# Patient Record
Sex: FEMALE | Race: BLACK OR AFRICAN AMERICAN | NOT HISPANIC OR LATINO | Employment: PART TIME | URBAN - METROPOLITAN AREA
[De-identification: names, ages, dates, MRNs, and addresses within clinical notes are randomized per-mention and may not be internally consistent; named-entity substitution may affect disease eponyms.]

---

## 2018-04-06 ENCOUNTER — TELEPHONE (OUTPATIENT)
Dept: OPHTHALMOLOGY | Facility: CLINIC | Age: 28
End: 2018-04-06

## 2018-04-06 NOTE — TELEPHONE ENCOUNTER
----- Message from Malathi De sent at 4/6/2018 11:37 AM CDT -----  Contact: Monalisa Villalobos pt called to discuss possibly scheduling a consult for cross linking, but need further information before doing so. Pt can be reached at [phone].

## 2018-04-09 ENCOUNTER — TELEPHONE (OUTPATIENT)
Dept: OPHTHALMOLOGY | Facility: CLINIC | Age: 28
End: 2018-04-09

## 2018-04-09 NOTE — TELEPHONE ENCOUNTER
----- Message from Mary Traore sent at 4/9/2018  1:28 PM CDT -----  Contact: Monalisa Serrano would like to schedule cross-linking consult. She can be reach [phone].

## 2018-04-24 ENCOUNTER — INITIAL CONSULT (OUTPATIENT)
Dept: OPHTHALMOLOGY | Facility: CLINIC | Age: 28
End: 2018-04-24
Attending: OPHTHALMOLOGY
Payer: MEDICAID

## 2018-04-24 DIAGNOSIS — H18.719 CORNEAL ECTASIA DUE TO LASER IN SITU KERATOMILEUSIS: Primary | ICD-10-CM

## 2018-04-24 DIAGNOSIS — H59.89 CORNEAL ECTASIA DUE TO LASER IN SITU KERATOMILEUSIS: Primary | ICD-10-CM

## 2018-04-24 PROCEDURE — 99999 PR PBB SHADOW E&M-EST. PATIENT-LVL II: CPT | Mod: PBBFAC,,, | Performed by: OPHTHALMOLOGY

## 2018-04-24 PROCEDURE — 99212 OFFICE O/P EST SF 10 MIN: CPT | Mod: PBBFAC | Performed by: OPHTHALMOLOGY

## 2018-04-24 PROCEDURE — 92004 COMPRE OPH EXAM NEW PT 1/>: CPT | Mod: S$PBB,,, | Performed by: OPHTHALMOLOGY

## 2018-04-24 RX ORDER — PHENTERMINE HYDROCHLORIDE 37.5 MG/1
37.5 TABLET ORAL DAILY
Refills: 0 | COMMUNITY
Start: 2018-04-13

## 2018-04-24 NOTE — PROGRESS NOTES
HPI     Referred by Dr. Wilmer archer/[facility] in [place], [place]    Patient states she had LASIK 12/ 2012 in [place]  Dr. Garcia. She began   to have decreased vision about 6 months after her LASIK. Vision began to   get worse in 2015    Last edited by Radha Patel on 4/24/2018  3:39 PM. (History)            Assessment /Plan     For exam results, see Encounter Report.    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  Treatment with the use of specialty contact lenses, collagen cross linking, and corneal transplantation was explained in detail.    This patient exhibits signs of progression of keratoconus and failure of conservative treatments with spectacles and/or contact lenses.   Disease progression is indicated by   - An increase of >1D in the Kmax  - An increase of >1D of astigmatism in the MRx  - An increase in myopia of >0.50D on MRx    I recommend treatment with Collagen Crosslinking using the Avedro FDA approved epi-off protocol with Photrexa and the KXL System, in order to stabilize this condition.    Plan:   KXL treatment OS worse, then OD  , 317 (OS may be too thin?)    I have informed the patient that we will seek insurance pre-approval, but in case of failure of the insurance company to cover the cost of this medically necessary procedure, there may be a cost of $2,000 for the patient.    RTC with topos from 2015... OD

## 2018-06-12 ENCOUNTER — OFFICE VISIT (OUTPATIENT)
Dept: OPHTHALMOLOGY | Facility: CLINIC | Age: 28
End: 2018-06-12
Attending: OPHTHALMOLOGY
Payer: MEDICAID

## 2018-06-12 DIAGNOSIS — H59.89 CORNEAL ECTASIA DUE TO LASER IN SITU KERATOMILEUSIS: ICD-10-CM

## 2018-06-12 DIAGNOSIS — H18.719 CORNEAL ECTASIA DUE TO LASER IN SITU KERATOMILEUSIS: ICD-10-CM

## 2018-06-12 DIAGNOSIS — H18.603 KERATOCONUS OF BOTH EYES: Primary | ICD-10-CM

## 2018-06-12 PROCEDURE — 92014 COMPRE OPH EXAM EST PT 1/>: CPT | Mod: S$PBB,,, | Performed by: OPHTHALMOLOGY

## 2018-06-12 PROCEDURE — 99212 OFFICE O/P EST SF 10 MIN: CPT | Mod: PBBFAC | Performed by: OPHTHALMOLOGY

## 2018-06-12 PROCEDURE — 99999 PR PBB SHADOW E&M-EST. PATIENT-LVL II: CPT | Mod: PBBFAC,,, | Performed by: OPHTHALMOLOGY

## 2018-06-12 NOTE — PROGRESS NOTES
HPI     DLS: 04/24/2018 Dr. Contreras    Referred by Dr. Wilmer archer/[facility] in [place], [place]    Patient states that there are no changes in vision or eyes since last   visit.    Eye Med(s): none only contact lens solution    Last edited by Junie Booker MA on 6/12/2018 11:49 AM. (History)            Assessment /Plan     For exam results, see Encounter Report.    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  Treatment with the use of specialty contact lenses, collagen cross linking, and corneal transplantation was explained in detail.    This patient exhibits signs of progression of keratoconus and failure of conservative treatments with spectacles and/or contact lenses.   Disease progression is indicated by   - An increase of >1D in the Kmax  - An increase of >1D of astigmatism in the MRx  - An increase in myopia of >0.50D on MRx    I recommend treatment with Collagen Crosslinking using the Avedro FDA approved epi-off protocol with Photrexa and the KXL System, in order to stabilize this condition.    Plan:   KXL treatment OS first, then OD  , 317   Will use GREEN (hypo-osmolar) Photrexa for OS.    I have informed the patient that we will seek insurance pre-approval, but in case of failure of the insurance company to cover the cost of this medically necessary procedure, there may be a cost of $2,000 for the patient.

## 2018-06-18 ENCOUNTER — PATIENT MESSAGE (OUTPATIENT)
Dept: OPHTHALMOLOGY | Facility: CLINIC | Age: 28
End: 2018-06-18

## 2018-06-19 ENCOUNTER — TELEPHONE (OUTPATIENT)
Dept: OPHTHALMOLOGY | Facility: CLINIC | Age: 28
End: 2018-06-19

## 2018-06-19 ENCOUNTER — PATIENT MESSAGE (OUTPATIENT)
Dept: OPHTHALMOLOGY | Facility: CLINIC | Age: 28
End: 2018-06-19

## 2018-06-19 NOTE — TELEPHONE ENCOUNTER
Returned pt call and left Voicemail stating that we can not do both eyes on the same day and as of right now we do not have any sooner appt but I will put her on the waiting list if someone else cancels. SDF

## 2018-06-19 NOTE — TELEPHONE ENCOUNTER
----- Message from Radha Patel sent at 6/18/2018  4:25 PM CDT -----  Monalisa Contreras MD 5 hours ago (10:28 AM)       Hi Dr. Gary depending on what the insurance says would   It be possible to do both eyes on the 30th?

## 2018-08-01 ENCOUNTER — TELEPHONE (OUTPATIENT)
Dept: OPHTHALMOLOGY | Facility: CLINIC | Age: 28
End: 2018-08-01

## 2018-08-01 NOTE — TELEPHONE ENCOUNTER
Left VM stating that we needed to move her procedure up to 8/23 instead of 8/30 due to Dr. Contreras not being in the office that day. Ask pt to please call us back. SDF

## 2018-08-01 NOTE — TELEPHONE ENCOUNTER
----- Message from Analilia Goodson sent at 8/1/2018  8:46 AM CDT -----  Contact: Monalisa Serrano   Pt returned the called,can we called the pt back please ,pt can be reached at [phone] please thank you.

## 2018-08-02 ENCOUNTER — PATIENT MESSAGE (OUTPATIENT)
Dept: OPHTHALMOLOGY | Facility: CLINIC | Age: 28
End: 2018-08-02

## 2018-08-06 ENCOUNTER — TELEPHONE (OUTPATIENT)
Dept: OPHTHALMOLOGY | Facility: CLINIC | Age: 28
End: 2018-08-06

## 2018-08-06 NOTE — TELEPHONE ENCOUNTER
Tried to call pt and let her know that I still need to discuss with Dr. Contreras about what eye to do. Pt number stated she is not taking calls at this time. SDF

## 2018-08-06 NOTE — TELEPHONE ENCOUNTER
----- Message from Radha Patel sent at 8/2/2018  3:11 PM CDT -----  Contact: Monalisa  This patient wants to know I she can have her right done first. She would like to get it over with so she can be getting fit for a new CTL so she will have one eye when her left eye is being done. Patient is nervous about being blind. Please call patient.  ----- Message -----  From: Mary Traore  Sent: 8/2/2018  12:46 PM  To: Diane SUAREZ Staff    Ms. Maximilianaiden would like for you to contact her concerning the email that she sent. She can be reached at [phone]

## 2018-08-15 ENCOUNTER — PATIENT MESSAGE (OUTPATIENT)
Dept: OPHTHALMOLOGY | Facility: CLINIC | Age: 28
End: 2018-08-15

## 2018-08-20 ENCOUNTER — TELEPHONE (OUTPATIENT)
Dept: OPHTHALMOLOGY | Facility: CLINIC | Age: 28
End: 2018-08-20

## 2018-08-20 NOTE — TELEPHONE ENCOUNTER
----- Message from Malathi De sent at 8/20/2018 11:10 AM CDT -----  Contact: Monalisa  Patient Returning Call from [facility]    Who Left Message for Patient:Nahomy  Communication Preference:[phone]  Additional Information:

## 2018-08-20 NOTE — TELEPHONE ENCOUNTER
Left VM that gtts were called into pharmacy and where to go on Thursday Morning since we changed the location to the s center. SDF

## 2018-08-23 ENCOUNTER — TELEPHONE (OUTPATIENT)
Dept: OPHTHALMOLOGY | Facility: CLINIC | Age: 28
End: 2018-08-23

## 2018-08-23 ENCOUNTER — CLINICAL SUPPORT (OUTPATIENT)
Dept: OPHTHALMOLOGY | Facility: CLINIC | Age: 28
End: 2018-08-23
Attending: OPHTHALMOLOGY
Payer: MEDICAID

## 2018-08-23 DIAGNOSIS — H18.719 CORNEAL ECTASIA DUE TO LASER IN SITU KERATOMILEUSIS: Primary | ICD-10-CM

## 2018-08-23 DIAGNOSIS — H59.89 CORNEAL ECTASIA DUE TO LASER IN SITU KERATOMILEUSIS: Primary | ICD-10-CM

## 2018-08-23 PROCEDURE — 99999 PR PBB SHADOW E&M-EST. PATIENT-LVL I: CPT | Mod: PBBFAC,,,

## 2018-08-23 PROCEDURE — 0402T COLGN CRS-LINK CRN&PACHYMTRY: CPT | Mod: PBBFAC | Performed by: OPHTHALMOLOGY

## 2018-08-23 PROCEDURE — 0402T COLGN CRS-LINK CRN&PACHYMTRY: CPT | Mod: S$PBB,LT,, | Performed by: OPHTHALMOLOGY

## 2018-08-23 PROCEDURE — 99211 OFF/OP EST MAY X REQ PHY/QHP: CPT | Mod: PBBFAC

## 2018-08-23 PROCEDURE — 92012 INTRM OPH EXAM EST PATIENT: CPT | Mod: 57,S$PBB,, | Performed by: OPHTHALMOLOGY

## 2018-08-23 RX ORDER — OXYCODONE AND ACETAMINOPHEN 5; 325 MG/1; MG/1
1 TABLET ORAL EVERY 4 HOURS PRN
Qty: 20 TABLET | Refills: 0 | Status: SHIPPED | OUTPATIENT
Start: 2018-08-23 | End: 2018-08-28

## 2018-08-23 NOTE — TELEPHONE ENCOUNTER
----- Message from Mary Traore sent at 8/23/2018  1:38 PM CDT -----  Contact: Monalisa Serrano says the pharmacy hasn't received the prescriptions that Nahomy called in.  She can be reached at [phone]    [facility] [address] [place] [address] - [address] [facility] AT [facility] OF [address] [address])  [address] [facility] [address]  Phone: [phone] Fax: [phone]  Not a 24 hour pharmacy; exact hours not known

## 2018-08-23 NOTE — PROGRESS NOTES
HPI     Here for CXL Tx    Last edited by Elba Contreras MD on 8/23/2018  3:59 PM. (History)            Assessment /Plan     For exam results, see Encounter Report.    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    PREOPERATIVE DIAGNOSIS: post LASIK ectasia    POSTOPERATIVE DIAGNOSIS:  Post lasik ectasia    PROCEDURES:    Collagen Crosslinking Left eye    ANESTHESIA: Topical Tetracaine 0.5%    COMPLICATIONS:  None    ESTIMATED BLOOD LOSS: None    SPECIMENS: None    INDICATIONS:  The patient has a history a progressive corneal ectasia.  During the initial consultation, a thorough discussion regarding of the risks, benefits, and alternatives to this procedure was undertaken.  Risks were listed on the consent form and were reviewed with emphasis on the remote possibility of infection, inflammation, scarring, and progression of ectasia - all of which can potentially lead to loss of vision.  Common side effects from the procedure, including pain, dry eye, glare, and haloes, were also explained, as well as defining realistic expectations of stabilizing the disease but not decreasing the need for glasses or contact lenses.  The patient voices understanding of these risks and side effects and communicates realistic expectations from the procedure.     DESCRIPTION OF PROCEDURE:  The patient was admitted to the [facility] at [facility] where the operative eye and procedure were verified, vital signs were taken, and pre-operatively 5-10mg of oral diazepam and drops of Zymar and Pred Forte were administered.  The patient was brought into the LASER suite and positioned on the bed.  A central 9mm epithelial debridement was achieved with the Amoils epithelial scrubber, and the initial riboflavin drops administered. A 30 minute induction with drops every 2 minutes was followed by pachymetry. When corneal pachy is less than 400um, hypotonic riboflavin drops are used to thicken  the cornea to a minimum of 400um. Next, a 30 min UV light treatment, centered on the cornea was delivered. Antibiotics and a bandage contact lens were placed.  The patient was discharged with care instructions and a follow up appointment in the eye clinic.

## 2018-08-28 ENCOUNTER — OFFICE VISIT (OUTPATIENT)
Dept: OPHTHALMOLOGY | Facility: CLINIC | Age: 28
End: 2018-08-28
Payer: MEDICAID

## 2018-08-28 DIAGNOSIS — H18.603 KERATOCONUS OF BOTH EYES: ICD-10-CM

## 2018-08-28 DIAGNOSIS — H59.89 CORNEAL ECTASIA DUE TO LASER IN SITU KERATOMILEUSIS: Primary | ICD-10-CM

## 2018-08-28 DIAGNOSIS — H18.719 CORNEAL ECTASIA DUE TO LASER IN SITU KERATOMILEUSIS: Primary | ICD-10-CM

## 2018-08-28 PROCEDURE — 99024 POSTOP FOLLOW-UP VISIT: CPT | Mod: ,,, | Performed by: OPHTHALMOLOGY

## 2018-08-28 PROCEDURE — 99999 PR PBB SHADOW E&M-EST. PATIENT-LVL II: CPT | Mod: PBBFAC,,, | Performed by: OPHTHALMOLOGY

## 2018-08-28 PROCEDURE — 99212 OFFICE O/P EST SF 10 MIN: CPT | Mod: PBBFAC | Performed by: OPHTHALMOLOGY

## 2018-08-28 RX ORDER — PREDNISOLONE ACETATE 10 MG/ML
1 SUSPENSION/ DROPS OPHTHALMIC 4 TIMES DAILY
COMMUNITY
End: 2018-09-26

## 2018-08-28 RX ORDER — OFLOXACIN 3 MG/ML
1 SOLUTION/ DROPS OPHTHALMIC 4 TIMES DAILY
COMMUNITY
End: 2018-09-26

## 2018-08-29 NOTE — PROGRESS NOTES
HPI     Dr. Contreras pt    Referred by Dr. Guillen w/[facility] in [place], [place]    S/p CXL OS 8/23/18    PF QID OS  Ocuflox QID OS    Pt here for post op CXL OS.  Pt states BCL OS fell out yesterday.  Pt   states FBS OS.  Pt denies pain OS.     Last edited by Yael Maher MA on 8/28/2018  2:54 PM.   (History)            Assessment /Plan     For exam results, see Encounter Report.    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  F/up dr. Contreras 1 mo

## 2018-09-26 ENCOUNTER — PATIENT MESSAGE (OUTPATIENT)
Dept: OPHTHALMOLOGY | Facility: CLINIC | Age: 28
End: 2018-09-26

## 2018-09-26 ENCOUNTER — OFFICE VISIT (OUTPATIENT)
Dept: OPHTHALMOLOGY | Facility: CLINIC | Age: 28
End: 2018-09-26
Payer: MEDICAID

## 2018-09-26 DIAGNOSIS — H18.719 CORNEAL ECTASIA DUE TO LASER IN SITU KERATOMILEUSIS: Primary | ICD-10-CM

## 2018-09-26 DIAGNOSIS — H59.89 CORNEAL ECTASIA DUE TO LASER IN SITU KERATOMILEUSIS: Primary | ICD-10-CM

## 2018-09-26 PROCEDURE — 92014 COMPRE OPH EXAM EST PT 1/>: CPT | Mod: S$PBB,,, | Performed by: OPHTHALMOLOGY

## 2018-09-26 PROCEDURE — 99999 PR PBB SHADOW E&M-EST. PATIENT-LVL II: CPT | Mod: PBBFAC,,, | Performed by: OPHTHALMOLOGY

## 2018-09-26 PROCEDURE — 99212 OFFICE O/P EST SF 10 MIN: CPT | Mod: PBBFAC | Performed by: OPHTHALMOLOGY

## 2018-09-26 NOTE — PROGRESS NOTES
HPI     Referred by Dr. Wilmer archer/[facility] in [place], [place]    S/p CXL OS 8/23/18  No eyedrops    Pt here for post op CXL OS.  Pt states vision seems like it is the same as   it was before the procedure.  Pt states some dryness OS. Pt denies pain   OS.    Last edited by Yael Maher MA on 9/26/2018  2:06 PM.   (History)            Assessment /Plan     For exam results, see Encounter Report.    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  No complaints.  The diagnosis of keratoconus and its etiology and clinical course were discussed.  Treatment with the use of specialty contact lenses, collagen cross linking, and corneal transplantation was explained in detail.    This patient exhibits signs of progression of keratoconus and failure of conservative treatments with spectacles and/or contact lenses.   Disease progression is indicated by   - An increase of >1D in the Kmax  - An increase of >1D of astigmatism in the MRx  - An increase in myopia of >0.50D on MRx    I recommend treatment with Collagen Crosslinking using the Avedro FDA approved epi-off protocol with Photrexa and the KXL System, in order to stabilize this condition.    Plan:   KXL treatment OS worse, then OD  , 317 (OS may be too thin?)    I have informed the patient that we will seek insurance pre-approval, but in case of failure of the insurance company to cover the cost of this medically necessary procedure, there may be a cost of $2,000 for the patient.      Plan CXL OD

## 2018-12-28 ENCOUNTER — PATIENT MESSAGE (OUTPATIENT)
Dept: OPHTHALMOLOGY | Facility: CLINIC | Age: 28
End: 2018-12-28

## 2019-01-02 ENCOUNTER — TELEPHONE (OUTPATIENT)
Dept: OPTOMETRY | Facility: CLINIC | Age: 29
End: 2019-01-02

## 2019-01-02 NOTE — TELEPHONE ENCOUNTER
----- Message from Nahomy Barbosa sent at 1/2/2019  8:32 AM CST -----      ----- Message -----  From: Yael Maher MA  Sent: 12/31/2018   3:48 PM  To: Monalisa Patel Just now (3:48 PM)       Good afternoon! Dr Diane Black's technician who schedules the procedure is out of the office today and will return on Wednesday.  I will make sure she receives the message and gets back to you. Sorry for the inconvenience.     Yael     This Patient Portal message has not been read.    Monalisa Serrano Pulin A., MD 3 days ago       Hi guys I had something come up that is going to take me out of town on the 26th is it possible to move  my surgery till a little bit later? I hate to ask but I dont feel like I will be able to travel by the 26th.

## 2019-01-21 ENCOUNTER — PATIENT MESSAGE (OUTPATIENT)
Dept: OPHTHALMOLOGY | Facility: CLINIC | Age: 29
End: 2019-01-21

## 2020-05-22 ENCOUNTER — PATIENT MESSAGE (OUTPATIENT)
Dept: OPHTHALMOLOGY | Facility: CLINIC | Age: 30
End: 2020-05-22

## 2020-06-09 ENCOUNTER — OFFICE VISIT (OUTPATIENT)
Dept: OPHTHALMOLOGY | Facility: CLINIC | Age: 30
End: 2020-06-09
Attending: OPHTHALMOLOGY
Payer: MEDICAID

## 2020-06-09 DIAGNOSIS — H18.603 KERATOCONUS OF BOTH EYES: ICD-10-CM

## 2020-06-09 DIAGNOSIS — H59.89 CORNEAL ECTASIA DUE TO LASER IN SITU KERATOMILEUSIS: Primary | ICD-10-CM

## 2020-06-09 DIAGNOSIS — H18.719 CORNEAL ECTASIA DUE TO LASER IN SITU KERATOMILEUSIS: Primary | ICD-10-CM

## 2020-06-09 PROCEDURE — 99999 PR PBB SHADOW E&M-EST. PATIENT-LVL II: ICD-10-PCS | Mod: PBBFAC,,, | Performed by: OPHTHALMOLOGY

## 2020-06-09 PROCEDURE — 99212 OFFICE O/P EST SF 10 MIN: CPT | Mod: PBBFAC | Performed by: OPHTHALMOLOGY

## 2020-06-09 PROCEDURE — 92014 COMPRE OPH EXAM EST PT 1/>: CPT | Mod: S$PBB,,, | Performed by: OPHTHALMOLOGY

## 2020-06-09 PROCEDURE — 92014 PR EYE EXAM, EST PATIENT,COMPREHESV: ICD-10-PCS | Mod: S$PBB,,, | Performed by: OPHTHALMOLOGY

## 2020-06-09 PROCEDURE — 99999 PR PBB SHADOW E&M-EST. PATIENT-LVL II: CPT | Mod: PBBFAC,,, | Performed by: OPHTHALMOLOGY

## 2020-06-09 NOTE — PROGRESS NOTES
"HPI     DLS 09/26/2018    S/p CXL OS 8/23/18    31 yo female here today for follow up Keratoconus OS, and Pentacam OU      Pt states: vision is stable when wearing her contacts  Current pair is old and cracked and has an appt for "[facility] "   in [place] on 06/11/2020   To be refit for Keratoconus lenses OS     Gtts:   AT's PRN OU           Last edited by Viviane Cr on 6/9/2020  2:21 PM. (History)            Assessment /Plan     For exam results, see Encounter Report.    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  Plan CXL OD, medicaide, so apply for PAP.      The diagnosis of keratoconus and its etiology and clinical course were discussed.  Treatment with the use of specialty contact lenses, collagen cross linking, and corneal transplantation was explained in detail. This patient has had LASIK in 2018.    This patient exhibits signs of progression of keratoconus and failure of conservative treatments with spectacles and/or contact lenses.   Disease progression is indicated by   - An increase of >1D in the Kmax  - An increase of >1D of astigmatism in the MRx  - An increase in myopia of >0.50D on MRx    I recommend treatment with Collagen Crosslinking using the Avedro FDA approved epi-off protocol with Photrexa and the KXL System, in order to stabilize this condition.    Plan:   KXL treatment OD      I have informed the patient that we will seek Patient Assistance from [employer], and if approved, there will be a cost of $500.                  "

## 2020-06-18 ENCOUNTER — TELEPHONE (OUTPATIENT)
Dept: OPHTHALMOLOGY | Facility: CLINIC | Age: 30
End: 2020-06-18

## 2020-07-21 DIAGNOSIS — H18.609 KERATOCONUS, UNSPECIFIED LATERALITY: Primary | ICD-10-CM

## 2020-09-04 RX ORDER — PREDNISOLONE ACETATE 10 MG/ML
1 SUSPENSION/ DROPS OPHTHALMIC 4 TIMES DAILY
Qty: 1 BOTTLE | Refills: 1 | Status: SHIPPED | OUTPATIENT
Start: 2020-09-08 | End: 2021-09-08

## 2020-09-04 RX ORDER — OFLOXACIN 3 MG/ML
1 SOLUTION/ DROPS OPHTHALMIC 4 TIMES DAILY
Qty: 1 BOTTLE | Refills: 0 | Status: SHIPPED | OUTPATIENT
Start: 2020-09-08 | End: 2020-09-18

## 2020-09-09 ENCOUNTER — TELEPHONE (OUTPATIENT)
Dept: OPHTHALMOLOGY | Facility: CLINIC | Age: 30
End: 2020-09-09

## 2020-09-09 NOTE — TELEPHONE ENCOUNTER
----- Message from Tere Lozoya sent at 9/9/2020  4:33 PM CDT -----  Regarding: Returned Call  Contact: Monalisa Pinedo returning call from [facility].  Please contact her back [phone]

## 2020-09-10 ENCOUNTER — CLINICAL SUPPORT (OUTPATIENT)
Dept: OPHTHALMOLOGY | Facility: CLINIC | Age: 30
End: 2020-09-10
Payer: MEDICAID

## 2020-09-10 DIAGNOSIS — H18.609 KERATOCONUS, UNSPECIFIED LATERALITY: ICD-10-CM

## 2020-09-10 PROCEDURE — 99999 PR PBB SHADOW E&M-EST. PATIENT-LVL II: ICD-10-PCS | Mod: PBBFAC,,,

## 2020-09-10 PROCEDURE — 99999 PR PBB SHADOW E&M-EST. PATIENT-LVL II: CPT | Mod: PBBFAC,,,

## 2020-09-10 PROCEDURE — 99212 OFFICE O/P EST SF 10 MIN: CPT | Mod: PBBFAC

## 2020-09-14 NOTE — PROGRESS NOTES
HPI     Follow-up      Additional comments: CXL OD              Comments     31 YO female presents today for CXL OD.     Pred OD QID  Ocuflox OD QID           Last edited by Antonella Sapp, PCT on 9/10/2020  9:14 AM. (History)            Assessment /Plan     For exam results, see Encounter Report.    Keratoconus, unspecified laterality  -     Collagen Cross-Linking

## 2020-09-15 ENCOUNTER — OFFICE VISIT (OUTPATIENT)
Dept: OPHTHALMOLOGY | Facility: CLINIC | Age: 30
End: 2020-09-15
Attending: OPHTHALMOLOGY
Payer: MEDICAID

## 2020-09-15 DIAGNOSIS — H18.719 CORNEAL ECTASIA DUE TO LASER IN SITU KERATOMILEUSIS: Primary | ICD-10-CM

## 2020-09-15 DIAGNOSIS — H59.89 CORNEAL ECTASIA DUE TO LASER IN SITU KERATOMILEUSIS: Primary | ICD-10-CM

## 2020-09-15 PROCEDURE — 99024 POSTOP FOLLOW-UP VISIT: CPT | Mod: ,,, | Performed by: OPHTHALMOLOGY

## 2020-09-15 PROCEDURE — 99999 PR PBB SHADOW E&M-EST. PATIENT-LVL III: ICD-10-PCS | Mod: PBBFAC,,, | Performed by: OPHTHALMOLOGY

## 2020-09-15 PROCEDURE — 99024 PR POST-OP FOLLOW-UP VISIT: ICD-10-PCS | Mod: ,,, | Performed by: OPHTHALMOLOGY

## 2020-09-15 PROCEDURE — 99213 OFFICE O/P EST LOW 20 MIN: CPT | Mod: PBBFAC | Performed by: OPHTHALMOLOGY

## 2020-09-15 PROCEDURE — 99999 PR PBB SHADOW E&M-EST. PATIENT-LVL III: CPT | Mod: PBBFAC,,, | Performed by: OPHTHALMOLOGY

## 2020-09-15 NOTE — PROGRESS NOTES
HPI     5 day CXL PO  OS    Patient reports that she is doing fine. Was in pain but has resolved and   feels better.             Last edited by Alton Jasso on 9/15/2020 11:31 AM. (History)            Assessment /Plan     For exam results, see Encounter Report.    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    Epi healed completely.  No signs of infection.  Ok to d/c abx. Cont Pred bid for 1 month.  FU 1 month.

## 2020-09-18 ENCOUNTER — PATIENT MESSAGE (OUTPATIENT)
Dept: OPHTHALMOLOGY | Facility: CLINIC | Age: 30
End: 2020-09-18

## 2020-09-18 ENCOUNTER — TELEPHONE (OUTPATIENT)
Dept: OPTOMETRY | Facility: CLINIC | Age: 30
End: 2020-09-18

## 2020-10-13 ENCOUNTER — OFFICE VISIT (OUTPATIENT)
Dept: OPHTHALMOLOGY | Facility: CLINIC | Age: 30
End: 2020-10-13
Attending: OPHTHALMOLOGY
Payer: MEDICAID

## 2020-10-13 DIAGNOSIS — H18.719 CORNEAL ECTASIA DUE TO LASER IN SITU KERATOMILEUSIS: Primary | ICD-10-CM

## 2020-10-13 DIAGNOSIS — H59.89 CORNEAL ECTASIA DUE TO LASER IN SITU KERATOMILEUSIS: Primary | ICD-10-CM

## 2020-10-13 PROCEDURE — 99999 PR PBB SHADOW E&M-EST. PATIENT-LVL II: CPT | Mod: PBBFAC,,, | Performed by: OPHTHALMOLOGY

## 2020-10-13 PROCEDURE — 99212 OFFICE O/P EST SF 10 MIN: CPT | Mod: PBBFAC | Performed by: OPHTHALMOLOGY

## 2020-10-13 PROCEDURE — 99999 PR PBB SHADOW E&M-EST. PATIENT-LVL II: ICD-10-PCS | Mod: PBBFAC,,, | Performed by: OPHTHALMOLOGY

## 2020-10-13 PROCEDURE — 92012 PR EYE EXAM, EST PATIENT,INTERMED: ICD-10-PCS | Mod: S$PBB,,, | Performed by: OPHTHALMOLOGY

## 2020-10-13 PROCEDURE — 92012 INTRM OPH EXAM EST PATIENT: CPT | Mod: S$PBB,,, | Performed by: OPHTHALMOLOGY

## 2020-10-13 NOTE — PROGRESS NOTES
HPI     5 day CXL PO  OD    Pt states hapy with vision OD  Has had contact lens fitting OS , just waiting to pickit up             Last edited by Viviane Cr on 10/13/2020  4:38 PM. (History)            Assessment /Plan     For exam results, see Encounter Report.    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  No complaints.  Pentacam done today.    CTL in OS, steep

## 2021-05-04 ENCOUNTER — PATIENT MESSAGE (OUTPATIENT)
Dept: RESEARCH | Facility: HOSPITAL | Age: 31
End: 2021-05-04

## 2022-04-11 ENCOUNTER — TELEPHONE (OUTPATIENT)
Dept: OPHTHALMOLOGY | Facility: CLINIC | Age: 32
End: 2022-04-11
Payer: MEDICAID

## 2022-04-11 NOTE — TELEPHONE ENCOUNTER
Spoke with the patient and scheduled her for the end of May at the [facility] location.    -[place]    ----- Message from Radha Patel sent at 4/11/2022  4:37 PM CDT -----  Contact: Monalisa @[phone]    ----- Message -----  From: Danya Jalloh  Sent: 4/11/2022   4:31 PM CDT  To: Diane SAUREZ Staff    Pt calling to get her yearly cornea fu appt at [facility] but no appts were available to offer

## 2022-06-21 ENCOUNTER — OFFICE VISIT (OUTPATIENT)
Dept: OPHTHALMOLOGY | Facility: CLINIC | Age: 32
End: 2022-06-21
Attending: OPHTHALMOLOGY
Payer: MEDICAID

## 2022-06-21 DIAGNOSIS — H18.719 CORNEAL ECTASIA DUE TO LASER IN SITU KERATOMILEUSIS: Primary | ICD-10-CM

## 2022-06-21 DIAGNOSIS — H59.89 CORNEAL ECTASIA DUE TO LASER IN SITU KERATOMILEUSIS: Primary | ICD-10-CM

## 2022-06-21 PROCEDURE — 1159F PR MEDICATION LIST DOCUMENTED IN MEDICAL RECORD: ICD-10-PCS | Mod: CPTII,,, | Performed by: OPHTHALMOLOGY

## 2022-06-21 PROCEDURE — 1160F RVW MEDS BY RX/DR IN RCRD: CPT | Mod: CPTII,,, | Performed by: OPHTHALMOLOGY

## 2022-06-21 PROCEDURE — 99999 PR PBB SHADOW E&M-EST. PATIENT-LVL II: ICD-10-PCS | Mod: PBBFAC,,, | Performed by: OPHTHALMOLOGY

## 2022-06-21 PROCEDURE — 92014 COMPRE OPH EXAM EST PT 1/>: CPT | Mod: S$PBB,,, | Performed by: OPHTHALMOLOGY

## 2022-06-21 PROCEDURE — 1160F PR REVIEW ALL MEDS BY PRESCRIBER/CLIN PHARMACIST DOCUMENTED: ICD-10-PCS | Mod: CPTII,,, | Performed by: OPHTHALMOLOGY

## 2022-06-21 PROCEDURE — 99212 OFFICE O/P EST SF 10 MIN: CPT | Mod: PBBFAC | Performed by: OPHTHALMOLOGY

## 2022-06-21 PROCEDURE — 92014 PR EYE EXAM, EST PATIENT,COMPREHESV: ICD-10-PCS | Mod: S$PBB,,, | Performed by: OPHTHALMOLOGY

## 2022-06-21 PROCEDURE — 99999 PR PBB SHADOW E&M-EST. PATIENT-LVL II: CPT | Mod: PBBFAC,,, | Performed by: OPHTHALMOLOGY

## 2022-06-21 PROCEDURE — 1159F MED LIST DOCD IN RCRD: CPT | Mod: CPTII,,, | Performed by: OPHTHALMOLOGY

## 2022-06-21 NOTE — PROGRESS NOTES
HPI     Patient presents for f/u s/p CXL OD    Pt notices changes in the right eye. Patient notes double vision in the   right eye. Patient denies pain and headaches since the crosslinking.    Pt wears Hard CL in the left eye.            Last edited by Spencer Denton on 6/21/2022  3:40 PM. (History)            Assessment /Plan     For exam results, see Encounter Report.    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  Cont with CTLs, update Rx

## 2023-03-29 ENCOUNTER — OFFICE VISIT (OUTPATIENT)
Dept: OPHTHALMOLOGY | Facility: CLINIC | Age: 33
End: 2023-03-29
Payer: MEDICAID

## 2023-03-29 DIAGNOSIS — H18.719 CORNEAL ECTASIA DUE TO LASER IN SITU KERATOMILEUSIS: Primary | ICD-10-CM

## 2023-03-29 DIAGNOSIS — H59.89 CORNEAL ECTASIA DUE TO LASER IN SITU KERATOMILEUSIS: Primary | ICD-10-CM

## 2023-03-29 PROCEDURE — 1160F PR REVIEW ALL MEDS BY PRESCRIBER/CLIN PHARMACIST DOCUMENTED: ICD-10-PCS | Mod: CPTII,,, | Performed by: OPHTHALMOLOGY

## 2023-03-29 PROCEDURE — 99212 OFFICE O/P EST SF 10 MIN: CPT | Mod: PBBFAC | Performed by: OPHTHALMOLOGY

## 2023-03-29 PROCEDURE — 1160F RVW MEDS BY RX/DR IN RCRD: CPT | Mod: CPTII,,, | Performed by: OPHTHALMOLOGY

## 2023-03-29 PROCEDURE — 92014 COMPRE OPH EXAM EST PT 1/>: CPT | Mod: S$PBB,,, | Performed by: OPHTHALMOLOGY

## 2023-03-29 PROCEDURE — 99999 PR PBB SHADOW E&M-EST. PATIENT-LVL II: CPT | Mod: PBBFAC,,, | Performed by: OPHTHALMOLOGY

## 2023-03-29 PROCEDURE — 92014 PR EYE EXAM, EST PATIENT,COMPREHESV: ICD-10-PCS | Mod: S$PBB,,, | Performed by: OPHTHALMOLOGY

## 2023-03-29 PROCEDURE — 99999 PR PBB SHADOW E&M-EST. PATIENT-LVL II: ICD-10-PCS | Mod: PBBFAC,,, | Performed by: OPHTHALMOLOGY

## 2023-03-29 PROCEDURE — 1159F MED LIST DOCD IN RCRD: CPT | Mod: CPTII,,, | Performed by: OPHTHALMOLOGY

## 2023-03-29 PROCEDURE — 1159F PR MEDICATION LIST DOCUMENTED IN MEDICAL RECORD: ICD-10-PCS | Mod: CPTII,,, | Performed by: OPHTHALMOLOGY

## 2023-03-29 NOTE — PROGRESS NOTES
HPI    Here for follow up post LASIK ectasia  CXL 2018  Last edited by Elba Contreras MD on 3/29/2023 11:18 AM.            Assessment /Plan     For exam results, see Encounter Report.    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